# Patient Record
Sex: MALE | Race: WHITE | HISPANIC OR LATINO | Employment: FULL TIME | URBAN - METROPOLITAN AREA
[De-identification: names, ages, dates, MRNs, and addresses within clinical notes are randomized per-mention and may not be internally consistent; named-entity substitution may affect disease eponyms.]

---

## 2024-10-24 ENCOUNTER — OFFICE VISIT (OUTPATIENT)
Dept: URGENT CARE | Age: 34
End: 2024-10-24
Payer: COMMERCIAL

## 2024-10-24 VITALS
DIASTOLIC BLOOD PRESSURE: 98 MMHG | OXYGEN SATURATION: 97 % | RESPIRATION RATE: 16 BRPM | HEART RATE: 60 BPM | TEMPERATURE: 98.7 F | SYSTOLIC BLOOD PRESSURE: 133 MMHG

## 2024-10-24 DIAGNOSIS — B27.90 INFECTIOUS MONONUCLEOSIS WITHOUT COMPLICATION, INFECTIOUS MONONUCLEOSIS DUE TO UNSPECIFIED ORGANISM: Primary | ICD-10-CM

## 2024-10-24 DIAGNOSIS — B37.0 ORAL THRUSH: ICD-10-CM

## 2024-10-24 DIAGNOSIS — K13.29: ICD-10-CM

## 2024-10-24 DIAGNOSIS — R03.0 ELEVATED BLOOD PRESSURE READING WITHOUT DIAGNOSIS OF HYPERTENSION: ICD-10-CM

## 2024-10-24 LAB
POC RAPID MONO: POSITIVE
POC RAPID STREP: NEGATIVE

## 2024-10-24 RX ORDER — NYSTATIN 100000 [USP'U]/ML
4 SUSPENSION ORAL 4 TIMES DAILY
Qty: 80 ML | Refills: 0 | Status: SHIPPED | OUTPATIENT
Start: 2024-10-24 | End: 2024-10-29

## 2024-10-24 NOTE — PROGRESS NOTES
Subjective   Patient ID: Ramu Hartman III is a 34 y.o. male. They present today with a chief complaint of Injury (Cut in mouth ).    History of Present Illness  Ramu is a 34-year-old male in a monogamous relationship with wife who presents to the urgent care for evaluation of mouth spots with discomfort on the roof of his mouth without known injury.  Patient denies known history of STDs or any recent acute illness.  Patient denies significant sore throat however notes some swollen glands in the neck.  Patient denies cough, fever, significant fatigue.  Patient denies any urinary symptoms, genital rash or discharge.  Patient is seeking evaluation reassurance.  Patient denies being immunocompromise, on steroid treatment or recent antibiotics.        Past Medical History  Allergies as of 10/24/2024    (No Known Allergies)       (Not in a hospital admission)       No past medical history on file.    No past surgical history on file.         Review of Systems  A 10-point review of systems was performed, otherwise unremarkable unless stated in the history of present illness.                Objective    Vitals:    10/24/24 1152   BP: (!) 133/98   Pulse: 60   Resp: 16   Temp: 37.1 °C (98.7 °F)   SpO2: 97%     No LMP for male patient.    Gen: Vitals noted and reviewed, no evidence of acute distress, well developed and afebrile.   Psych: Mood and affect appropriate for setting.  Head/Face: Atraumatic and normocephalic.   Neuro: No focal deficits noted.  Mouth/Throat: Hard palate at top of mouth with petechial rash with surrounding white plaques predominantly on the left side without evidence of edema, masses, vesicles or pustules or discharge.  No active bleeding noted.  Posterior oropharynx without erythema, exudate, or swelling. Uvula is in the midline and non-edematous.   Neck: Supple. No meningismus through full range of motion. +posterior chain cervical lymphadenopathy b/l.   Cardiac: Regular rate and rhythm no murmur.    Lungs: Clear to auscultation throughout, No evidence of wheezing, rhonchi or crackles. Good aeration throughout.   Extremities: Symmetrical, No peripheral edema  Skin: Without evidence of ecchymosis, wounds, or rashes.      Point of Care Test & Imaging Results from this visit  Results for orders placed or performed in visit on 10/24/24   POCT Infectious mononucleosis antibody manually resulted   Result Value Ref Range    POC Rapid Mono Positive (A) Negative   POCT rapid strep A manually resulted   Result Value Ref Range    POC Rapid Strep Negative Negative      No results found.    Diagnostic study results (if any) were reviewed by Alice Hunter DO.    Assessment/Plan   Allergies, medications, history, and pertinent labs/EKGs/Imaging reviewed by Alice Hunter DO.     Medical Decision Making  Discussed with the patient symptoms and clinical presentation findings suggestive of a petechial rash to the upper hard palate likely secondary mononucleosis infection.  We advise close symptom monitoring supportive treatment.  We also advised possible developing thrush of unclear etiology which may be secondary to candidiasis.  We agreed to prescribe oral nystatin swish and spit for this and advised stopping this treatment if no improvement.  Otherwise we discussed that mononucleosis is self-limited and to avoid any contact sport with the increased risk of splenic laceration/injury. Follow up with PCP. We advised seeking immediate emergency medical attention if symptoms fail to improve, worsen or any concerning symptoms arise. Patient voiced full understanding and agreement to plan.      Orders and Diagnoses  Diagnoses and all orders for this visit:  Infectious mononucleosis without complication, infectious mononucleosis due to unspecified organism  Oral pigmented patches  -     POCT Infectious mononucleosis antibody manually resulted  -     POCT rapid strep A manually resulted  Oral thrush  -     nystatin (Mycostatin)  100,000 unit/mL suspension; Take 4 mL (400,000 Units) by mouth 4 times a day for 5 days. Swish and spit  Elevated blood pressure reading without diagnosis of hypertension      Medical Admin Record      Patient disposition: Home    Electronically signed by Alice Hunter DO  12:42 PM